# Patient Record
Sex: FEMALE
[De-identification: names, ages, dates, MRNs, and addresses within clinical notes are randomized per-mention and may not be internally consistent; named-entity substitution may affect disease eponyms.]

---

## 2022-01-17 ENCOUNTER — HOSPITAL ENCOUNTER (INPATIENT)
Dept: HOSPITAL 5 - TRG | Age: 22
LOS: 2 days | Discharge: HOME | End: 2022-01-19
Attending: OBSTETRICS & GYNECOLOGY | Admitting: OBSTETRICS & GYNECOLOGY
Payer: COMMERCIAL

## 2022-01-17 DIAGNOSIS — Z3A.38: ICD-10-CM

## 2022-01-17 DIAGNOSIS — Z23: ICD-10-CM

## 2022-01-17 DIAGNOSIS — Z20.822: ICD-10-CM

## 2022-01-17 LAB
BACTERIA #/AREA URNS HPF: (no result) /HPF
BILIRUB UR QL STRIP: (no result)
BLOOD UR QL VISUAL: (no result)
HCT VFR BLD CALC: 30.9 % (ref 30.3–42.9)
HGB BLD-MCNC: 9.5 GM/DL (ref 10.1–14.3)
MCHC RBC AUTO-ENTMCNC: 31 % (ref 30–34)
MCV RBC AUTO: 72 FL (ref 79–97)
MUCOUS THREADS #/AREA URNS HPF: (no result) /HPF
PH UR STRIP: 6 [PH] (ref 5–7)
PLATELET # BLD: 235 K/MM3 (ref 140–440)
RBC # BLD AUTO: 4.3 M/MM3 (ref 3.65–5.03)
RBC #/AREA URNS HPF: 2 /HPF (ref 0–6)
UROBILINOGEN UR-MCNC: < 2 MG/DL (ref ?–2)
WBC #/AREA URNS HPF: 45 /HPF (ref 0–6)

## 2022-01-17 PROCEDURE — 90471 IMMUNIZATION ADMIN: CPT

## 2022-01-17 PROCEDURE — 10907ZC DRAINAGE OF AMNIOTIC FLUID, THERAPEUTIC FROM PRODUCTS OF CONCEPTION, VIA NATURAL OR ARTIFICIAL OPENING: ICD-10-PCS | Performed by: ADVANCED PRACTICE MIDWIFE

## 2022-01-17 PROCEDURE — 85014 HEMATOCRIT: CPT

## 2022-01-17 PROCEDURE — 85025 COMPLETE CBC W/AUTO DIFF WBC: CPT

## 2022-01-17 PROCEDURE — U0003 INFECTIOUS AGENT DETECTION BY NUCLEIC ACID (DNA OR RNA); SEVERE ACUTE RESPIRATORY SYNDROME CORONAVIRUS 2 (SARS-COV-2) (CORONAVIRUS DISEASE [COVID-19]), AMPLIFIED PROBE TECHNIQUE, MAKING USE OF HIGH THROUGHPUT TECHNOLOGIES AS DESCRIBED BY CMS-2020-01-R: HCPCS

## 2022-01-17 PROCEDURE — 81001 URINALYSIS AUTO W/SCOPE: CPT

## 2022-01-17 PROCEDURE — 86850 RBC ANTIBODY SCREEN: CPT

## 2022-01-17 PROCEDURE — 76819 FETAL BIOPHYS PROFIL W/O NST: CPT

## 2022-01-17 PROCEDURE — 3E0R3BZ INTRODUCTION OF ANESTHETIC AGENT INTO SPINAL CANAL, PERCUTANEOUS APPROACH: ICD-10-PCS | Performed by: ADVANCED PRACTICE MIDWIFE

## 2022-01-17 PROCEDURE — 87086 URINE CULTURE/COLONY COUNT: CPT

## 2022-01-17 PROCEDURE — G0463 HOSPITAL OUTPT CLINIC VISIT: HCPCS

## 2022-01-17 PROCEDURE — 3E033VJ INTRODUCTION OF OTHER HORMONE INTO PERIPHERAL VEIN, PERCUTANEOUS APPROACH: ICD-10-PCS | Performed by: ADVANCED PRACTICE MIDWIFE

## 2022-01-17 PROCEDURE — 86900 BLOOD TYPING SEROLOGIC ABO: CPT

## 2022-01-17 PROCEDURE — 99211 OFF/OP EST MAY X REQ PHY/QHP: CPT

## 2022-01-17 PROCEDURE — 36415 COLL VENOUS BLD VENIPUNCTURE: CPT

## 2022-01-17 PROCEDURE — 76815 OB US LIMITED FETUS(S): CPT

## 2022-01-17 PROCEDURE — 0HQ9XZZ REPAIR PERINEUM SKIN, EXTERNAL APPROACH: ICD-10-PCS | Performed by: ADVANCED PRACTICE MIDWIFE

## 2022-01-17 PROCEDURE — 86901 BLOOD TYPING SEROLOGIC RH(D): CPT

## 2022-01-17 PROCEDURE — 85027 COMPLETE CBC AUTOMATED: CPT

## 2022-01-17 PROCEDURE — 85018 HEMOGLOBIN: CPT

## 2022-01-17 PROCEDURE — 90715 TDAP VACCINE 7 YRS/> IM: CPT

## 2022-01-17 PROCEDURE — 86592 SYPHILIS TEST NON-TREP QUAL: CPT

## 2022-01-17 PROCEDURE — 00HU33Z INSERTION OF INFUSION DEVICE INTO SPINAL CANAL, PERCUTANEOUS APPROACH: ICD-10-PCS | Performed by: ADVANCED PRACTICE MIDWIFE

## 2022-01-17 RX ADMIN — IBUPROFEN SCH MG: 600 TABLET, FILM COATED ORAL at 22:09

## 2022-01-17 RX ADMIN — FENTANYL CITRATE PRN MCG: 50 INJECTION, SOLUTION INTRAMUSCULAR; INTRAVENOUS at 14:45

## 2022-01-17 RX ADMIN — FENTANYL CITRATE PRN MCG: 50 INJECTION, SOLUTION INTRAMUSCULAR; INTRAVENOUS at 17:30

## 2022-01-17 NOTE — HISTORY AND PHYSICAL REPORT
History of Present Illness


Date of examination: 22


Date of admission: 


2022


Chief complaint: 


Decreased fetal movement





History of present illness: 


21 year old female  presents to L&D complaining of decreased fetal 

movement since yesterday. 


Patient denies leaking of fluid or vaginal bleeding. Patient denies falls or 

abdominal trauma.


Patient received prenatal care at [facility] office; partial prenatal 

record is available (full prenatal record has been requested).


LMP 21. EDC 2022.


Pregnancy significant for the following: underweight, GBS positive.


Prenatal labs results are not available at this time but they have been requeste

d.











Past History


Past Medical History: other (history of 2 miscarriages)


Past Surgical History: no surgical history


GYN History: denies: chlamydia, gonorrhea, hepatitis B, hepatitis C, herpes, 

HIV, syphilis, trichomonas


Family/Genetic History: diabetes, congenital heart defect (patient's sister was 

born with congenital heart defect)


Social history: lives with family, full code.  denies: smoking, alcohol abuse, 

prescription drug abuse, IV drug use





- Obstetrical History


Expected Date of Delivery: 22


Actual Gestation: 38 Week(s) 6 Day(s) 


: 3


Para: 0


Hx # Term Pregnancies: 0


Number of  Pregnancies: 0


Spontaneous Abortions: 2


Induced : 0


Number of Living Children: 0





Medications and Allergies


                                    Allergies











Allergy/AdvReac Type Severity Reaction Status Date / Time


 


Penicillins Allergy Unknown Itching Verified 22 05:14











Active Meds: 


Active Medications





Acetaminophen (Acetaminophen 325 Mg Tab)  650 mg PO Q4H PRN


   PRN Reason: Pain, Mild (1-3)


Carboprost Tromethamine (Carboprost Tromethamine 250 Mcg/1 Ml Inj)  250 mcg IM 

ONCE PRN


   PRN Reason: Uterine Bleeding


Ephedrine Sulfate (Ephedrine Sulfate 50 Mg/1 Ml Inj)  10 mg IV Q2M PRN


   PRN Reason: Hypotension


Fentanyl (Fentanyl 100 Mcg/2 Ml Inj)  100 mcg IV Q2H PRN


   PRN Reason: Pain,Severe (7-10) LABOR PAIN


Oxytocin/Sodium Chloride (Pitocin/Ns 30 Unit/500ml)  30 units in 500 mls @ 2 

mls/hr IV TITR SKYLER; Protocol


Lactated Ringer's (Lactated Ringers)  1,000 mls @ 125 mls/hr IV AS DIRECT SKYLER


Oxytocin/Sodium Chloride (Pitocin/Ns 30 Unit/500ml)  30 units in 500 mls @ 40 

mls/hr IV TITR SKYLER; Protocol


Vancomycin HCl (Vancomycin/Ns 1 Gm/250 Ml)  1 gm in 250 mls @ 167.007 mls/hr IV 

Q12H SKYLER; Protocol


Loperamide HCl (Loperamide 2 Mg Cap)  2 mg PO ONCE PRN


   PRN Reason: give with Hemabate


Methylergonovine Maleate (Methylergonovine Maleate 0.2 Mg/Ml Vial)  0.2 mg IM 

ONCE PRN


   PRN Reason: Uterine Bleeding


Misoprostol (Misoprostol 200 Mcg Tab)  800 mcg ND ONCE PRN


   PRN Reason: Uterine Bleeding


Nalbuphine HCl (Nalbuphine 10 Mg/1 Ml Inj)  10 mg IV Q2H PRN


   PRN Reason: Pain, Moderate (4-6)


Oxytocin (Oxytocin 10 Unit/1 Ml Inj)  10 unit IM ONCE PRN


   PRN Reason: Uterine Bleeding


Terbutaline Sulfate (Terbutaline 1 Mg/1 Ml Inj)  0.25 mg SUB-Q ONCE PRN


   PRN Reason: Hyperstimulation/Hypertonicity











Review of Systems


All systems: negative (contractions, decreased fetal movement)





- Vital Signs


Vital signs: 


                                   Vital Signs











Pulse BP


 


 81   113/68 


 


 22 01:17  22 01:17








                                        











Temp Pulse Resp BP Pulse Ox


 


    81      113/68    


 


    22 01:22 01:17   














- Physical Exam


Abdomen: Positive: normal appearance, soft.  Negative: distention, tenderness, 

guarding, rigidity


Genitourinary (Female): Positive: normal external genitalia, normal perenium.  

Negative: perineal/vulvar lesions


Vagina: Positive: normal moisture


Uterus: Positive: enlarged.  Negative: tender


Anus/Rectum: Positive: normal perianal skin


Extremities: Negative: tenderness, edema





- Obstetrical


FHR: category 2


FHR comments: 


FHR baseline 115 with minimal to moderate variability and rare acceleration. 

Occasional variable FHR deceleration.





Uterine Contraction Monitor Mode: External


Cervical Dilatation: 2


Cervical Effacement Percentage: 70 (Cephalic presentation confirmed by US)


Fetal station: 0


Uterine Contraction Pattern: Irregular


Uterine Contraction Intensity: Mild





Results


                              Abnormal lab results











  22 Range/Units





  01:16 


 


Urine WBC (Auto)  45.0 H  (0.0-6.0)  /HPF


 


U Epithel Cells (Auto)  19.0 H  (0-13.0)  /HPF








All other labs normal.








Assessment and Plan


A: Pregnancy at 38 weeks, 6 days gestation. 


Early labor. 


GBS positive (allergic to PCN).


Category 2 FHR tracing.


Decreased fetal movement.


P: Admit.


Continuous EFM.


Lateral positioning; IV fluid bolus. 


GBS prophylaxis.


Full prenatal record has been requested. 


Augmentation of labor with Pitocin once full prenatal record has been received.


Consulted with Dr. Galan re: this patient; MD in agreement with POC.

## 2022-01-17 NOTE — PROGRESS NOTE
Labor Epidural





- Labor Epidural


Start Time: 19:29


Stop Time: 19:52


Performed by:: OMERO RUFFIN


Procedure: 





Patient is requesting epidural for labor pain. H&P and labs reviewed. Procedure 

explained, questions answered, consent obtained. Patient placed in sitting 

position with monitors applied. Timeout performed immediately before start of 

procedure. Prep/drape in usual sterile fashion. Skin localized 3 mL 1% lidocaine

at L[3]-L[4] interspace. 17-gauge Touhy epidural needle advanced to JO with 

saline x 3attempts at [6] cm. No blood/CSF noted via epidural needle.  Epidural 

catheter advanced to [9] cm. Negative aspiration for blood and CSF via catheter,

 negative response to test dose 3 ml 1.5% lidocaine w/ epi. Sterile dressing 

applied followed by tape reinforcement. Patient tolerated procedure well. No 

immediate complications noted.

## 2022-01-17 NOTE — ANESTHESIA CONSULTATION
Anesthesia Consult and Med Hx


Date of service: 01/17/22





- Airway


Anesthetic Teeth Evaluation: Good


ROM Head & Neck: Adequate


Mental/Hyoid Distance: Adequate


Mallampati Class: Class II


Intubation Access Assessment: Good





- Pulmonary Exam


CTA: Yes





- Cardiac Exam


Cardiac Exam: RRR





- Pre-Operative Health Status


ASA Pre-Surgery Classification: ASA2


Proposed Anesthetic Plan: Epidural





- Pulmonary


Hx Smoking: No


Hx Asthma: No


Hx Respiratory Symptoms: No


SOB: No


COPD: No


Home Oxygen Therapy: No


Hx Pneumonia: No


Hx Sleep Apnea: No





- Cardiovascular System


Hx Hypertension: No


Hx Coronary Artery Disease: No


Hx Heart Attack/AMI: No


Hx Angina: No


Hx Percutaneous Transluminal Coronary Angioplasty (PTCA): No


Hx Cardia Arrhythmia: No


Hx Pacemaker: No


Hx Internal Defibrillator: No


Hx Valvular Heart Disease: No


Hx Heart Murmur: No


Hx Peripheral Vascular Disease: No





- Central Nervous System


Hx Neuromuscular Disorder: No


Hx Seizures: No


CVA: No


Hx Back Pain: Yes


Hx Psychiatric Problems: No





- Gastrointestinal


Hx Ulcer: No


Hx Gastroesophageal Reflux Disease: No





- Endocrine


Hx Renal Disease: No


Hx End Stage Renal Disease: No


Hx Cirrhosis: No


Hx Liver Disease: No


Hx Insulin Dependent Diabetes: No


Hx Non-Insulin Dependent Diabetes: No


Hx Thyroid Disease: No


Hx Hypothyroidism: No


Hx Hyperthyroidism: No





- Hematic


Hx Anemia: No


Hx Sickle Cell Disease: No





- Other Systems


Hx Alcohol Use: No


Hx Substance Use: No


Hx Cancer: No


Hx Obesity: No

## 2022-01-17 NOTE — ULTRASOUND REPORT
ULTRASOUND OBSTETRIC LIMITED 

ULTRASOUND FETAL BIOPHYSICAL PROFILE



INDICATION / CLINICAL INFORMATION: ASHLEY. Fetal well-being.

Clinical Gestational Age (GA) in weeks, days: 38, 6 



TECHNIQUE: Transabdominal.



COMPARISON: None available.



FINDINGS:



FETAL BREATHING MOVEMENT = 2

GROSS BODY MOVEMENT = 2

FETAL TONE = 2

QUALITATIVE AMNIOTIC FLUID VOLUME = 2



TOTAL BIOPHYSICAL SCORE = 8/8



FETAL HEART RATE (beats per minute): 109 



AMNIOTIC FLUID INDEX (cm) =  19.5   (normal = 7-24 cm)

PRESENTATION: Cephalic. 



ADDITIONAL FINDINGS: None.



IMPRESSION:

1. Fetal Biophysical Score = 8/8 

2. Normal amniotic fluid index of 19.5 cm.



Signer Name: MITCHELL Gil MD 

Signed: 1/17/2022 4:19 AM

Workstation Name: [id number]

## 2022-01-17 NOTE — PROCEDURE NOTE
OB Delivery Note





- Delivery


Date of Delivery: 22 ()


Surgeon: MANAV SRINIVASAN


Estimated blood loss: 200cc





- Vaginal


Delivery presentation: vertex, compound


Intrapartum events: none


Delivery induction: oxytocin


Delivery augmentation: rupture of membranes, pitocin


Delivery monitor: internal FHT, internal uterine


Route of delivery: 


Delivery placenta: spontaneous


Delivery cord: 3 umbilical vessels


Episiotomy: none


Delivery laceration: 1st degree


Delivery repair: vicryl


Anesthesia: epidural


Delivery comments: 


 of a live 6'12 male infant with a left hand compound presentation over 

first degree right labial and vaginal lacerations under epidural anesthesia at 

 on 2022.  Infant directly to maternal abd/chest, skin to skin 

contact.  Vaginal lacerations repaired with 2-0 Vicryl on a CT-1.  Spontaneous 

delivery of placenta complete and intact with [place] side presenting at .  

Fundus is firm and midline located 4 below the U.  Lochia is scant. GBS 

prophylaxis x 1. Delayed cord clamping and cutting; Cord cut by Father of the 

Baby. Cord blood collected; Placenta discarded. 








- Infant


  ** A


Apgar at 1 minute: 8


Apgar at 5 minutes: 9


Infant Gender: Male (6'12)

## 2022-01-17 NOTE — ULTRASOUND REPORT
ULTRASOUND OBSTETRIC LIMITED 

ULTRASOUND FETAL BIOPHYSICAL PROFILE



INDICATION / CLINICAL INFORMATION: ASHLEY. Fetal well-being.

Clinical Gestational Age (GA) in weeks, days: 38, 6 



TECHNIQUE: Transabdominal.



COMPARISON: None available.



FINDINGS:



FETAL BREATHING MOVEMENT = 2

GROSS BODY MOVEMENT = 2

FETAL TONE = 2

QUALITATIVE AMNIOTIC FLUID VOLUME = 2



TOTAL BIOPHYSICAL SCORE = 8/8



FETAL HEART RATE (beats per minute): 109 



AMNIOTIC FLUID INDEX (cm) =  19.5   (normal = 7-24 cm)

PRESENTATION: Cephalic. 



ADDITIONAL FINDINGS: None.



IMPRESSION:

1. Fetal Biophysical Score = 8/8 

2. Normal amniotic fluid index of 19.5 cm.



Signer Name: MITCHELL Gil MD 

Signed: 1/17/2022 4:19 AM

Workstation Name: [id number]

## 2022-01-17 NOTE — PROGRESS NOTE
Assessment and Plan


A: IUP @ 38 6/7 Weeks


     Category I Tracing


     Decreased Fetal Movement


     Pruritus 


     + GBS with PCN Allergy 





P: AROM


    Internals X 2


    Continue Pitocin Induction


    Vistaril 25mg Po x 1 dose


    Continue GBS Prophylaxis with Vancomycin








Subjective





- Subjective


Date of service: 01/17/22


Patient reports: new complaints (Around 9AM; patient complians of swelling of 

lips, itching of neck and back; and a little itching  of throat), contractions 

(mild)





Objective





- Vital Signs


Vital Signs: 


                               Vital Signs - 12hr











  01/17/22 01/17/22 01/17/22 01:17 06:33 06:40


 


Temperature   


 


Pulse Rate 81  64


 


Respiratory   





Rate   


 


Blood Pressure 113/68  


 


Blood Pressure   





[Right]   


 


O2 Sat by Pulse   100





Oximetry   


 


O2 Sat by Pulse  100 





Oximetry [   





Bilateral   





Throughout]   














  01/17/22 01/17/22 01/17/22





  06:45 06:50 06:55


 


Temperature   


 


Pulse Rate 63 65 64


 


Respiratory   





Rate   


 


Blood Pressure   


 


Blood Pressure   





[Right]   


 


O2 Sat by Pulse 100 99 99





Oximetry   


 


O2 Sat by Pulse   





Oximetry [   





Bilateral   





Throughout]   














  01/17/22 01/17/22 01/17/22





  06:59 07:00 07:01


 


Temperature 98.7 F  


 


Pulse Rate 64 68 


 


Respiratory 16  





Rate   


 


Blood Pressure 120/91  


 


Blood Pressure 120/91  





[Right]   


 


O2 Sat by Pulse 100 99 





Oximetry   


 


O2 Sat by Pulse   100





Oximetry [   





Bilateral   





Throughout]   














  01/17/22 01/17/22 01/17/22





  07:05 07:10 07:15


 


Temperature   


 


Pulse Rate 62 69 63


 


Respiratory   





Rate   


 


Blood Pressure   


 


Blood Pressure   





[Right]   


 


O2 Sat by Pulse 99 100 99





Oximetry   


 


O2 Sat by Pulse   





Oximetry [   





Bilateral   





Throughout]   














  01/17/22 01/17/22 01/17/22





  07:20 07:25 07:30


 


Temperature   


 


Pulse Rate 65 70 69


 


Respiratory   





Rate   


 


Blood Pressure   


 


Blood Pressure   





[Right]   


 


O2 Sat by Pulse 99 99 99





Oximetry   


 


O2 Sat by Pulse   





Oximetry [   





Bilateral   





Throughout]   














  01/17/22 01/17/22 01/17/22





  07:35 07:40 07:45


 


Temperature   


 


Pulse Rate 69 67 82


 


Respiratory   





Rate   


 


Blood Pressure   


 


Blood Pressure   





[Right]   


 


O2 Sat by Pulse 99 99 98





Oximetry   


 


O2 Sat by Pulse   





Oximetry [   





Bilateral   





Throughout]   














  01/17/22 01/17/22 01/17/22





  07:50 07:55 08:00


 


Temperature   


 


Pulse Rate 75 76 75


 


Respiratory   





Rate   


 


Blood Pressure   


 


Blood Pressure   





[Right]   


 


O2 Sat by Pulse 100 99 100





Oximetry   


 


O2 Sat by Pulse   





Oximetry [   





Bilateral   





Throughout]   














  01/17/22 01/17/22 01/17/22





  08:05 08:10 08:15


 


Temperature   


 


Pulse Rate 72 69 74


 


Respiratory   





Rate   


 


Blood Pressure   


 


Blood Pressure   





[Right]   


 


O2 Sat by Pulse 100 99 99





Oximetry   


 


O2 Sat by Pulse   





Oximetry [   





Bilateral   





Throughout]   














  01/17/22 01/17/22 01/17/22





  08:20 08:25 08:30


 


Temperature   


 


Pulse Rate 69 69 70


 


Respiratory   





Rate   


 


Blood Pressure   


 


Blood Pressure   





[Right]   


 


O2 Sat by Pulse 99 99 98





Oximetry   


 


O2 Sat by Pulse   





Oximetry [   





Bilateral   





Throughout]   














  01/17/22 01/17/22 01/17/22





  08:35 08:39 08:44


 


Temperature   


 


Pulse Rate 65 76 75


 


Respiratory   





Rate   


 


Blood Pressure   


 


Blood Pressure   





[Right]   


 


O2 Sat by Pulse 100 88 98





Oximetry   


 


O2 Sat by Pulse   





Oximetry [   





Bilateral   





Throughout]   














  01/17/22 01/17/22 01/17/22





  08:49 08:54 08:59


 


Temperature   


 


Pulse Rate 80 79 81


 


Respiratory   





Rate   


 


Blood Pressure   107/69


 


Blood Pressure   





[Right]   


 


O2 Sat by Pulse 99 99 98





Oximetry   


 


O2 Sat by Pulse   





Oximetry [   





Bilateral   





Throughout]   














  01/17/22 01/17/22 01/17/22





  09:04 09:09 09:26


 


Temperature   


 


Pulse Rate 79 83 82


 


Respiratory   





Rate   


 


Blood Pressure   


 


Blood Pressure   





[Right]   


 


O2 Sat by Pulse 99 98 98





Oximetry   


 


O2 Sat by Pulse   





Oximetry [   





Bilateral   





Throughout]   














  01/17/22 01/17/22 01/17/22





  09:31 09:36 09:41


 


Temperature   


 


Pulse Rate 77 81 71


 


Respiratory   





Rate   


 


Blood Pressure   


 


Blood Pressure   





[Right]   


 


O2 Sat by Pulse 98 99 100





Oximetry   


 


O2 Sat by Pulse   





Oximetry [   





Bilateral   





Throughout]   














  01/17/22 01/17/22 01/17/22





  09:46 09:51 09:56


 


Temperature   


 


Pulse Rate 84 87 80


 


Respiratory   





Rate   


 


Blood Pressure   


 


Blood Pressure   





[Right]   


 


O2 Sat by Pulse 100 98 99





Oximetry   


 


O2 Sat by Pulse   





Oximetry [   





Bilateral   





Throughout]   














  01/17/22 01/17/22 01/17/22





  10:01 10:06 10:11


 


Temperature   


 


Pulse Rate 82 73 73


 


Respiratory   





Rate   


 


Blood Pressure 117/65  


 


Blood Pressure   





[Right]   


 


O2 Sat by Pulse 98 98 99





Oximetry   


 


O2 Sat by Pulse   





Oximetry [   





Bilateral   





Throughout]   














  01/17/22 01/17/22 01/17/22





  10:16 10:21 10:26


 


Temperature   


 


Pulse Rate 84 74 61


 


Respiratory   





Rate   


 


Blood Pressure   


 


Blood Pressure   





[Right]   


 


O2 Sat by Pulse 98 99 100





Oximetry   


 


O2 Sat by Pulse   





Oximetry [   





Bilateral   





Throughout]   














  01/17/22 01/17/22 01/17/22





  10:30 10:31 10:47


 


Temperature   


 


Pulse Rate 72 74 77


 


Respiratory   





Rate   


 


Blood Pressure   


 


Blood Pressure   





[Right]   


 


O2 Sat by Pulse 92 100 98





Oximetry   


 


O2 Sat by Pulse   





Oximetry [   





Bilateral   





Throughout]   














  01/17/22 01/17/22 01/17/22





  10:52 10:57 11:02


 


Temperature   


 


Pulse Rate 79 77 71


 


Respiratory   





Rate   


 


Blood Pressure   


 


Blood Pressure   





[Right]   


 


O2 Sat by Pulse 100 100 100





Oximetry   


 


O2 Sat by Pulse   





Oximetry [   





Bilateral   





Throughout]   














- Exam


Breasts: normal


Cardiovascular: Regular rate


Lungs: Clear to auscultation, Normal air movement


Abdomen: Present: normal appearance, soft, normal bowel sounds


Uterus: Present: normal, firm, fundal height above umbilicus


FHR: category 1


Uterine Contraction Monitor Mode: Internal


Cervical Dilatation: 2 (Large amount of clear fluid upon AROM at 1055)


Cervical Effacement Percentage: 80


Fetal station: -1


Uterine Contraction Frequency (min): 1-2.5 min


Uterine Contraction Pattern: Regular


Uterine Tone Measurement Phase: Resting


Uterine Contraction Intensity: Mild


Extremities: normal





- Labs


Labs: 


                                  Abnormal Labs











  01/17/22 01/17/22





  01:16 06:32


 


Hgb   9.5 L


 


MCV   72 L


 


MCH   22 L


 


RDW   18.6 H


 


Urine WBC (Auto)  45.0 H 


 


U Epithel Cells (Auto)  19.0 H 








                         Laboratory Results - last 24 hr











  01/17/22 01/17/22 01/17/22





  01:16 06:32 06:32


 


WBC    8.4


 


RBC    4.30


 


Hgb    9.5 L


 


Hct    30.9


 


MCV    72 L


 


MCH    22 L


 


MCHC    31


 


RDW    18.6 H


 


Plt Count    235


 


Urine Color  Yellow  


 


Urine Turbidity  Slightly-cloudy  


 


Urine pH  6.0  


 


Ur Specific Gravity  1.020  


 


Urine Protein  30 mg/dl  


 


Urine Glucose (UA)  Neg  


 


Urine Ketones  Neg  


 


Urine Blood  Neg  


 


Urine Nitrite  Neg  


 


Urine Bilirubin  Neg  


 


Urine Urobilinogen  < 2.0  


 


Ur Leukocyte Esterase  Sm  


 


Urine WBC (Auto)  45.0 H  


 


Urine RBC (Auto)  2.0  


 


U Epithel Cells (Auto)  19.0 H  


 


Urine Bacteria (Auto)  1+  


 


Urine Mucus  Few  


 


Syphilis IgG Antibody   Nonreactive 


 


Blood Type   


 


Antibody Screen   














  01/17/22





  06:32


 


WBC 


 


RBC 


 


Hgb 


 


Hct 


 


MCV 


 


MCH 


 


MCHC 


 


RDW 


 


Plt Count 


 


Urine Color 


 


Urine Turbidity 


 


Urine pH 


 


Ur Specific Gravity 


 


Urine Protein 


 


Urine Glucose (UA) 


 


Urine Ketones 


 


Urine Blood 


 


Urine Nitrite 


 


Urine Bilirubin 


 


Urine Urobilinogen 


 


Ur Leukocyte Esterase 


 


Urine WBC (Auto) 


 


Urine RBC (Auto) 


 


U Epithel Cells (Auto) 


 


Urine Bacteria (Auto) 


 


Urine Mucus 


 


Syphilis IgG Antibody 


 


Blood Type  O POSITIVE


 


Antibody Screen  Negative

## 2022-01-18 LAB
BASOPHILS # (AUTO): 0 K/MM3 (ref 0–0.1)
BASOPHILS NFR BLD AUTO: 0.3 % (ref 0–1.8)
EOSINOPHIL # BLD AUTO: 0.1 K/MM3 (ref 0–0.4)
EOSINOPHIL NFR BLD AUTO: 0.7 % (ref 0–4.3)
HCT VFR BLD CALC: 25.3 % (ref 30.3–42.9)
HCT VFR BLD CALC: 29.3 % (ref 30.3–42.9)
HGB BLD-MCNC: 7.7 GM/DL (ref 10.1–14.3)
HGB BLD-MCNC: 8.8 GM/DL (ref 10.1–14.3)
LYMPHOCYTES # BLD AUTO: 2.2 K/MM3 (ref 1.2–5.4)
LYMPHOCYTES NFR BLD AUTO: 16.2 % (ref 13.4–35)
MCHC RBC AUTO-ENTMCNC: 30 % (ref 30–34)
MCV RBC AUTO: 73 FL (ref 79–97)
MONOCYTES # (AUTO): 0.9 K/MM3 (ref 0–0.8)
MONOCYTES % (AUTO): 6.6 % (ref 0–7.3)
PLATELET # BLD: 217 K/MM3 (ref 140–440)
RBC # BLD AUTO: 4.01 M/MM3 (ref 3.65–5.03)

## 2022-01-18 RX ADMIN — IBUPROFEN SCH MG: 600 TABLET, FILM COATED ORAL at 23:06

## 2022-01-18 RX ADMIN — IBUPROFEN SCH MG: 600 TABLET, FILM COATED ORAL at 03:52

## 2022-01-18 RX ADMIN — FERROUS SULFATE TAB 325 MG (65 MG ELEMENTAL FE) SCH MG: 325 (65 FE) TAB at 10:30

## 2022-01-18 RX ADMIN — IBUPROFEN SCH MG: 600 TABLET, FILM COATED ORAL at 10:30

## 2022-01-18 RX ADMIN — Medication SCH EACH: at 10:30

## 2022-01-18 RX ADMIN — IBUPROFEN SCH MG: 600 TABLET, FILM COATED ORAL at 15:15

## 2022-01-18 RX ADMIN — FERROUS SULFATE TAB 325 MG (65 MG ELEMENTAL FE) SCH MG: 325 (65 FE) TAB at 23:06

## 2022-01-18 NOTE — DISCHARGE SUMMARY
Providers





- Providers


Date of Admission: 


22 05:01





Date of discharge: 22


Attending physician: 


CECILIA PAIGE





Primary care physician: 


CECILIA PAIGE








Hospitalization


Reason for admission: active labor, IUP at term


Delivery: 


Episiotomy: none


Laceration: 1st degree


Incision: normal, intact


Other postpartum procedures: none


Postpartum complications: none


Discharge diagnosis: IUP at term delivered


 baby: male


Hospital course: 





Pt was admitted to [facility] in labor and had a  w/o pp complications. See H&P, 

delivery summary, and pp notes.


Condition at discharge: Stable


Disposition:  HOME / SELF CARE / HOMELESS





Plan





- Discharge Medications


Prescriptions: 


Ibuprofen [Motrin 600 MG tab] 600 mg PO Q6H PRN #30 tablet


 PRN Reason: Menstrual Cramps





- Provider Discharge Summary


Activity: routine, no sex for 6 weeks, no heavy lifting 4 weeks, no strenuous 

exercise


Diet: routine


Instructions: routine


Additional instructions: 


[]  Smoking cessation referral if applicable(refer to patient education folder 

for contact #)


[]  Refer to [facility] Booklet








Call your doctor immediately for:


* Fever > 100.5


* Heavy vaginal bleeding ( >1 pad per hour)


* Severe persistent headache


* Shortness of breath


* Reddened, hot, painful area to leg or breast


* Drainage or odor from incision.





* Keep incision clean and dry at all times and follow doctor's instructions 

regarding bathing/showering











- Follow up plan


Follow up: 


CECILIA PAIGE MD [Primary Care Provider] - 6 Weeks

## 2022-01-18 NOTE — PROGRESS NOTE
Assessment and Plan





A: S/P 





P: Continue routine pp orders


    D/C home tomm if stable





Subjective





- Subjective


Date of service: 22


Principal diagnosis: s/p 


Patient reports: appetite normal, voiding normally, pain well controlled, 

ambulating normally


: doing well, bottle feeding





Objective





- Vital Signs


Latest vital signs: 


                                   Vital Signs











  Temp Pulse Resp BP BP Pulse Ox Pulse Ox


 


 22 07:41  98.4 F  69  20  96/68   99 


 


 22 07:30        98


 


 22 05:00  97.7 F  70  18  114/69   97 


 


 22 03:52    18    


 


 22 23:47  98.5 F  80    103/61  


 


 22 23:34        100


 


 22 22:08   75     99 


 


 22 22:03   75     99 


 


 22 21:58   77   106/67   99 


 


 22 21:53   80     100 


 


 22 21:48   82     99 


 


 22 21:44  99.1 F   18    


 


 22 21:43   81     99 


 


 22 21:38   73     99 


 


 22 21:33   78     99 


 


 22 21:28   83   114/61   98 


 


 22 21:23   80     99 


 


 22 21:20   90     93 


 


 22 21:18   75     100 


 


 22 21:13   90     100 


 


 22 21:08   86     100 


 


 22 21:03   90     100 


 


 22 20:59       80 L 


 


 22 20:58   93 H   116/56   


 


 22 20:38   113 H   142/100   


 


 22 20:30   103 H   130/77   98 


 


 22 20:25   85     97 


 


 22 20:23   104 H   138/64   


 


 22 20:20   94 H     98 


 


 22 20:15   88     99 


 


 22 20:13   100 H   127/79   


 


 22 20:10   99 H     94 


 


 22 20:09   84   131/81   


 


 22 20:05   82     99 


 


 22 20:04   81   123/77   


 


 22 20:00   81     98 


 


 22 19:57   87   129/70   


 


 01/17/22 19:55   92 H   130/79   98 


 


 22 19:53   92 H   127/75   


 


 22 19:51   96 H   127/79   


 


 22 19:50   98 H     99 


 


 22 19:48   99 H     93 


 


 22 19:45   86     99 


 


 22 19:40   91 H     97 


 


 22 19:35   95 H     99 


 


 22 19:33   96 H   142/100   


 


 22 19:30   97 H     95 


 


 22 19:25   94 H     99 


 


 22 19:20   93 H     100 


 


 22 19:15   93 H     99 


 


 22 19:10   85     100 


 


 22 19:06  98.3 F   19    


 


 22 19:05   91 H     100 


 


 22 19:03        100


 


 22 18:52   85   142/87   


 


 22 18:39  98.3 F      


 


 22 17:09  97.8 F      


 


 22 17:06   86     99 


 


 22 17:01   76     99 


 


 22 16:56   93 H     97 


 


 22 16:52   80   144/82   94 


 


 22 16:51   83     97 


 


 22 16:46   73     97 


 


 22 16:41   91 H     99 


 


 22 16:36   85     98 


 


 22 16:31   85     98 


 


 22 16:26   80     99 


 


 22 16:21   93 H     98 


 


 22 16:16   74     100 


 


 22 16:11   70     98 


 


 22 16:06   79     100 


 


 22 16:01   80     99 


 


 22 15:56   75     100 


 


 22 15:51   82   136/94   99 


 


 22 15:46   78     99 


 


 22 15:41   77     98 


 


 22 15:36   70     99 


 


 22 15:31   77     99 


 


 22 15:26   74     99 


 


 22 15:21   74     98 


 


 22 15:16   75     99 


 


 22 15:11   78     97 


 


 22 15:06   75     98 


 


 22 15:01   76     97 


 


 22 14:58   78  17   112/70  98 


 


 22 14:56   80     97 


 


 22 14:51   78   112/70   98 


 


 22 14:46   79     100 


 


 22 14:41   78     100 


 


 22 14:36   74     99 


 


 22 14:31   76     99 


 


 22 14:26   71     99 


 


 22 14:21   76     98 


 


 22 14:16   77     99 


 


 22 14:11   72     99 


 


 22 14:06   80     99 


 


 22 14:01   72     98 


 


 22 13:56   74     100 


 


 22 13:51   83   115/74   96 


 


 22 13:50   74     92 


 


 22 13:46   74     98 


 


 22 13:41   77     98 


 


 22 13:39  98.3 F   18    


 


 22 13:22   65     98 


 


 22 13:17   65     99 


 


 22 13:12   75     99 


 


 22 13:07   75     100 


 


 22 13:02   75     100 


 


 22 12:57   68     98 


 


 22 12:52   80   118/77   100 


 


 22 12:51   68   111/71   


 


 22 12:47   75     98 


 


 22 12:42   75     98 


 


 22 12:37   66     98 


 


 22 12:32   75     98 


 


 22 12:27   67     99 


 


 22 12:22   82     100 


 


 22 12:17   70     100 


 


 22 12:12   72     100 


 


 22 12:07   78     99 


 


 22 12:02   77     100 


 


 22 11:57   77     99 


 


 22 11:52   66     99 


 


 22 11:51   71   115/71   


 


 22 11:47   68     99 


 


 22 11:42   76     99 


 


 22 11:37   71     100 


 


 22 11:32   73     99 


 


 22 11:30   67   127/86   


 


 22 11:27   66     99 


 


 22 11:22   70     100 


 


 22 11:17   66     100 


 


 22 11:12   68     100 


 


 22 11:07   70     99 


 


 22 11:02   71     100 


 


 22 10:57   77     100 


 


 22 10:52   79     100 


 


 22 10:47   77     98 


 


 22 10:31   74     100 


 


 22 10:30   72     92 


 


 22 10:26   61     100 


 


 22 10:21   74     99 


 


 22 10:16   84     98 


 


 22 10:11   73     99 


 


 22 10:06   73     98 


 


 22 10:01   82   117/65   98 


 


 22 09:56   80     99 


 


 22 09:51   87     98 


 


 22 09:46   84     100 


 


 22 09:41   71     100 


 


 22 09:36   81     99 


 


 22 09:31   77     98 


 


 22 09:26   82     98 








                                Intake and Output











 22





 22:59 06:59 14:59


 


Intake Total 62.167 240 


 


Output Total 400  


 


Balance -337.833 240 


 


Intake:   


 


  IV 62.167  


 


    PITOCin/NS 30 UNIT/500ML 62.167  





    30 units In 500 ml @ 2   





    mls/hr IV TITR SKYLER Rx#:   





    [id number]   


 


  Intake, [place]  240 


 


Output:   


 


  Urine 400  


 


    Indwelling Catheter 400  


 


Other:   


 


  Total, Output Amount 400  


 


  # Voids   


 


    Void  300 


 


  Estimated Blood Loss 200  














- Exam


Breasts: Present: normal


Abdomen: Present: normal appearance, soft, normal bowel sounds


Vulva: both: normal


Uterus: Present: normal, firm, fundal height below umbilicus


Extremities: Present: normal


Incision: Present: normal, intact

## 2022-01-19 VITALS — DIASTOLIC BLOOD PRESSURE: 75 MMHG | SYSTOLIC BLOOD PRESSURE: 120 MMHG

## 2022-01-19 PROCEDURE — 3E0234Z INTRODUCTION OF SERUM, TOXOID AND VACCINE INTO MUSCLE, PERCUTANEOUS APPROACH: ICD-10-PCS | Performed by: ADVANCED PRACTICE MIDWIFE

## 2022-01-19 RX ADMIN — IBUPROFEN SCH MG: 600 TABLET, FILM COATED ORAL at 12:11

## 2022-01-19 RX ADMIN — Medication SCH EACH: at 12:11

## 2022-01-19 RX ADMIN — IBUPROFEN SCH MG: 600 TABLET, FILM COATED ORAL at 05:32

## 2022-01-19 RX ADMIN — FERROUS SULFATE TAB 325 MG (65 MG ELEMENTAL FE) SCH MG: 325 (65 FE) TAB at 12:11
